# Patient Record
Sex: FEMALE | Race: WHITE | URBAN - NONMETROPOLITAN AREA
[De-identification: names, ages, dates, MRNs, and addresses within clinical notes are randomized per-mention and may not be internally consistent; named-entity substitution may affect disease eponyms.]

---

## 2022-01-14 ENCOUNTER — OFFICE VISIT (OUTPATIENT)
Dept: FAMILY MEDICINE CLINIC | Age: 37
End: 2022-01-14
Payer: COMMERCIAL

## 2022-01-14 VITALS
BODY MASS INDEX: 35.4 KG/M2 | RESPIRATION RATE: 17 BRPM | TEMPERATURE: 97.3 F | WEIGHT: 199.8 LBS | DIASTOLIC BLOOD PRESSURE: 82 MMHG | SYSTOLIC BLOOD PRESSURE: 118 MMHG | HEIGHT: 63 IN | HEART RATE: 83 BPM | OXYGEN SATURATION: 97 %

## 2022-01-14 DIAGNOSIS — Z20.822 ENCOUNTER FOR LABORATORY TESTING FOR COVID-19 VIRUS: ICD-10-CM

## 2022-01-14 DIAGNOSIS — J06.9 VIRAL URI WITH COUGH: Primary | ICD-10-CM

## 2022-01-14 DIAGNOSIS — J06.9 VIRAL URI WITH COUGH: ICD-10-CM

## 2022-01-14 LAB
Lab: NORMAL
PERFORMING INSTRUMENT: NORMAL
QC PASS/FAIL: NORMAL
SARS-COV-2, POC: NORMAL

## 2022-01-14 PROCEDURE — G8427 DOCREV CUR MEDS BY ELIG CLIN: HCPCS | Performed by: PHYSICIAN ASSISTANT

## 2022-01-14 PROCEDURE — G8484 FLU IMMUNIZE NO ADMIN: HCPCS | Performed by: PHYSICIAN ASSISTANT

## 2022-01-14 PROCEDURE — G8417 CALC BMI ABV UP PARAM F/U: HCPCS | Performed by: PHYSICIAN ASSISTANT

## 2022-01-14 PROCEDURE — 87426 SARSCOV CORONAVIRUS AG IA: CPT | Performed by: PHYSICIAN ASSISTANT

## 2022-01-14 PROCEDURE — 99213 OFFICE O/P EST LOW 20 MIN: CPT | Performed by: PHYSICIAN ASSISTANT

## 2022-01-14 PROCEDURE — 1036F TOBACCO NON-USER: CPT | Performed by: PHYSICIAN ASSISTANT

## 2022-01-14 NOTE — LETTER
[address] [place] [address], [place] [address]  Phone: [phone]  Fax: [phone]    [place] Alpers. Joana Dwyer PA-C      1/14/2022     Patient: Wesly Lai   YOB: 1985       To Whom It May Concern: It is my medical opinion that Wesly Lai should remain out of work while acutely ill and awaiting COVID-19 test results. Return to work with no retesting should be followed if test is negative AND meets these criteria as outlined by CDC/[facility]:     a. No fever without the use of fever reducers for 24 hours  b. Improvement in symptoms     If tests positive for COVID-19, needs minimum of 5 days strict quarantine, improvement of symptoms and 24 hours fever free without fever reducing medications. If you have any questions or concerns, please don't hesitate to call. Sincerely,        D[place] Alpers.  MARLON John

## 2022-01-17 LAB
SARS-COV-2: NOT DETECTED
SOURCE: NORMAL

## 2024-05-17 NOTE — PROGRESS NOTES
Chief Complaint       Headache (since yesterday), Chest Congestion, Cough, and Sinusitis      History of Present Illness   Source of history provided by:  patient. Sanna Padron is a 29 y.o. old female presenting to the walk in clinic for evaluation of headache, chest congestion, nonproductive cough, and sinus pressure since yesterday. Denies any fever, chills, loss of taste or smell, CP, dyspnea, LE edema, abdominal pain, vomiting, rash, or lethargy. Denies any hx of asthma or tobacco use. Patient's significant other is also currently sick with a similar illness. Patient has not been vaccinated for COVID-19. ROS    Unless otherwise stated in this report or unable to obtain because of the patient's clinical or mental status as evidenced by the medical record, this patients's positive and negative responses for Review of Systems, constitutional, psych, eyes, ENT, cardiovascular, respiratory, gastrointestinal, neurological, genitourinary, musculoskeletal, integument systems and systems related to the presenting problem are either stated in the preceding or were not pertinent or were negative for the symptoms and/or complaints related to the medical problem. Past Medical History:  has no past medical history on file. Past Surgical History:  has no past surgical history on file. Social History:  reports that she has never smoked. She has never used smokeless tobacco. She reports that she does not drink alcohol and does not use drugs. Family History: family history is not on file. Allergies: Patient has no known allergies. Physical Exam         VS:  /82   Pulse 83   Temp 97.3 °F (36.3 °C) (Temporal)   Resp 17   Ht 5' 3\" (1.6 m)   Wt 199 lb 12.8 oz (90.6 kg)   SpO2 97%   BMI 35.39 kg/m²    Oxygen Saturation Interpretation: Normal.    Constitutional:  Alert, development consistent with age. NAD. Head:  NC/NT. Airway patent.   Mild TTP noted over the bilateral ethmoid sinuses. Mouth: Posterior pharynx with mild erythema and clear postnasal drip. No tonsillar hypertrophy or exudate. Neck:  Normal ROM. Supple. No anterior cervical adenopathy noted. Lungs: CTAB without wheezes, rales, or rhonchi. CV:  Regular rate and rhythm, normal heart sounds, without pathological murmurs, ectopy, gallops, or rubs. Skin:  Normal turgor. Warm, dry, without visible rash. Lymphatic: No lymphangitis or adenopathy noted. Neurological:  Oriented. Motor functions intact. Lab / Imaging Results   (All laboratory and radiology results have been personally reviewed by myself)  Labs:  No results found for this visit on 01/14/22. Imaging: All Radiology results interpreted by Radiologist unless otherwise noted. Assessment / Plan     Impression(s):  Santi RAMOS was seen today for headache, chest congestion, cough and sinusitis. Diagnoses and all orders for this visit:    Viral URI with cough  -     POCT COVID-19, Antigen  -     COVID-19 Ambulatory; Future    Encounter for laboratory testing for COVID-19 virus  -     POCT COVID-19, Antigen  -     COVID-19 Ambulatory; Future      Disposition:  Disposition: Discharge to home. Rapid COVID-19 testing is negative in office. Confirmatory PCR swab obtained and pending, will call with results once available. Advised cautionary self-quarantine at home in the interim. Pt should remain out of work and the general public until results are confirmed. Pt should also be fever free for 24 hours and symptoms should be improved overall prior to returning. Increase fluids and rest. Symptomatic relief discussed including Tylenol prn pain/fever. Schedule virtual f/u with PCP in 7-10 days if symptoms persist. ED sooner if symptoms worsen or change. ED immediately with high or refractory fever, progressive SOB, dyspnea, CP, calf pain/swelling, shaking chills, vomiting, abdominal pain, lethargy, flank pain, or decreased urinary output.  Pt verbalizes understanding and is in agreement with plan of care. All questions answered. Fer John PA-C    **This report was transcribed using voice recognition software. Every effort was made to ensure accuracy; however, inadvertent computerized transcription errors may be present. No Resident Program within this Specialty at this Location